# Patient Record
(demographics unavailable — no encounter records)

---

## 2021-07-30 NOTE — NUR
Consult rec'd for no insurance and medication assistance. Writer visited with
the pt and her spouse at bedside. She works part time and does not have ins.
She does have a cardiologist but can not afford to see if often and was not
able to get medication. She has been screened [facility] and they started
a mo medicaid application. She will be eligable for medicare next year at age
65. [facility] clinics and goodx coupon given and discussed. [facility] walkin clinic encouraged. Four scripts given to the [facility] pharmacy
and vouchered for the pt. They will be locked in the med room to be issued at
MD. Pt will try to get any others at [facility]. Nursing updated. Possible dc 1-2
days.

## 2021-07-30 NOTE — NUR
REPORT GIVEN TO CCU RN JAN. CURRENT NTG DRIP SETTING IS 10MCG/MIN(6ML/HR).
INCREASED HEADACHE WITH INCREASED NTG DOSE - RECEIVED TYLENOL AND DISCOMFORT
IS TOLERABLE FOR PATIENT AT THIS TIME.

## 2021-07-30 NOTE — NUR
ASSUMED CARE SHIFT CHANGE. VSS BP ELEVATED ORDERS RECEIVED. BP STABLE NITRO
GTT DC THIS AM. PT UP WALKING CIRILO WELL. DENIES CP/SOB. FAMILY AT BEDSIDE.
PRESCRIPTIONS STORED IN IV BIN SECTION IN [email or web address] FOR WHEN PT DC'S. SR TELE.
CONT POC. WILL REPORT TO NOC RN.

## 2021-07-30 NOTE — 2DMMODE
[facility]
[place]   [address]                   2 D/M-MODE ECHOCARDIOGRAM     
_______________________________________________________________________________
 
Name:       GERALDO ROWELL               Room #:         217-P       ADM IN  
..#:      [id number]                       Account #:      [id number]  
Admission:  07/29/21    Attend Phys:    Candido Taylor MD     
Discharge:              Date of Birth:  03/26/56  
                                                          Report #: [id number]
                                                                    [id number]
_______________________________________________________________________________
THIS REPORT FOR:  
 
cc:  CARMEN - Avelina family physician/PCP 
     CARMEN - Avelina family physician/PCP 
     Abel Fernandes MD [facility]                                         
                                                                       ~
 
--------------- APPROVED REPORT --------------
 
 
Study performed:  07/30/2021 06:50:21
 
EXAM: Comprehensive 2D, Doppler, and color-flow 
Echocardiogram 
Patient Location: Bedside   
Room #:  217     Status:  routine
 
      BSA:         1.87
HR: 79 bpm BP:          152/107 mmHg 
Rhythm: NSR     
 
Other Information 
Study Quality: Good
 
Indications
Leg edema, [place], elevated troponin, CHF.
 
2D Dimensions
RVDd:  37.00 mm   
IVSd:  13.00 (7-11mm)  LVOT Diam:  20.33 (18-24mm) 
LVDd:  48.00 mm   
PWd:  13.00 (7-11mm)  Ascending Ao:  38.00 (22-36mm)
LVDs:  35.00 (25-40mm)  
Left Atrium:  43.00 (27-40mm) 
Aortic Root:  31.25 mm  
 
Volumes
Left Atrial Volume (Systole) 
Single Plane 4CH:  83.26 mL Single Plane 2CH:  86.27 mL
    LA ESV Index:  49.00 mL/m2
 
Aortic Valve
AoV Peak Chato.:  2.16 m/s 
AO Peak Gr.:  18.64 mmHg LVOT Max PG:  15.99 mmHg
AO Mean Gr.:  10.91 mmHg 
AO V2 Mean:  1.60 m/s  LVOT Max V:  2.00 m/s
 
 
[facility]
[address]
[place]  [address]
Phone:  [phone]                    2 D/M-MODE ECHOCARDIOGRAM     
_______________________________________________________________________________
 
Name:            GERALDO ROWELL               Room #:        217-P       [facility] [place]
..#:           [id number]          Account #:     [id number]  
Admission:       07/29/21         Attend Phys:   Candido Taylor MD 
Discharge:                  Date of Birth: 03/26/56  
                         Report #:      [id number]
        [id number]
_______________________________________________________________________________
AO V2 VTI:  39.21 cm  
ONEIL Vmax: 3.00 cm2  
AI Vmax:  5.08 m/s  
AI Carlisle:  3.87 m/s2  
AI PHT:  380.78 ms  
 
Mitral Valve
   MV Decel. Time:  266.19 ms
MV PHT:  77.20 ms 
 
Pulmonary Valve
PV Peak Chato.:  0.70 m/s PV Peak Gr.:  1.94 mmHg
 
Tricuspid Valve
TR Peak Chato.:  3.72 m/s  RAP Estimate:  10.00 mmHg
TR Peak Gr.:  55.46 mmHg 
    PA Pressure:  65.00 mmHg
 
Left Ventricle
The left ventricle is normal size. There is normal LV segmental wall 
motion. Mild concentric left ventricular hypertrophy. Moderate basal 
septal hypertrophy is present. Left ventricular systolic function is 
normal. LVEF is 60-65%.
 
Right Ventricle
The right ventricle is normal size. The right ventricular systolic 
function is normal.
 
Atria
Left atrium is severely dilated. Right atrium is moderately 
dilated.
 
Aortic Valve
Aortic valve is trileaflet; mildly sclerotic.  Mild to moderate 
aortic regurgitation. There is no aortic valvular stenosis.
 
Mitral Valve
Mitral valve leaflets are moderately thickened with mild restricted 
mobility. Severe mitral regurgitation. Mild mitral stenosis (Peak 
pressure gradient of 18mmHg; mean 7mmHg).
 
Tricuspid Valve
The tricuspid valve is normal in structure. Moderate tricuspid 
regurgitation. Estimated PAP is 65mmHg.
 
Pulmonic Valve
 
 
[facility]
[address]
[place]  [address]
Phone:  [phone]                    2 D/M-MODE ECHOCARDIOGRAM     
_______________________________________________________________________________
 
Name:            GERALDO ROWELL               Room #:        217-P       [facility] [place]
.R.#:           [id number]          Account #:     [id number]  
Admission:       07/29/21         Attend Phys:   Candido Taylor MD 
Discharge:                  Date of Birth: 03/26/56  
                         Report #:      [id number]
        [id number]
_______________________________________________________________________________
The pulmonary valve is normal in structure. There is no pulmonic 
valvular regurgitation.
 
Great Vessels
The aortic root is normal in size. The ascending aorta is normal in 
size. IVC is dilated and collapses <50% with 
inspiration.
 
Pericardium
There is no pericardial effusion.
 
<Conclusion>
Normal left ventricular size with mild concentric hypertrophy and 
moderate basal septal hypertrophy
Ejection fraction 60%
Normal right ventricular size/function
Left atrium moderate to severely dilated, right atrium moderately 
dilated
Color-flow Doppler studies performed of the 
aortic/mitral/tricuspid/pulmonary valve
Mild to moderate aortic valve insufficiency
Severe mitral valve insufficiency
Myxomatous mitral valve
Mild mitral valve stenosis, mean gradient of 7 mmHg
Moderate tricuspid valve insufficiency
Pulmonary systolic pressure estimated 65 mmHg
No pericardial effusion
Normal aortic root size.
 
 
 
 
 
 
 
 
 
 
 
 
 
 
 
 
  <ELECTRONICALLY SIGNED>
   By: Abel Fernandes MD, FACC    
  07/30/21     0753
D: 07/30/21 0753                           _____________________________________
T: 07/30/21 0753                           Abel Fernandes MD, FACC      /INF

## 2021-07-30 NOTE — EKG
[address]  [address]
Phone:  [phone]                    ELECTROCARDIOGRAM REPORT      
_______________________________________________________________________________
 
Name:       GERALDO ROWELL               Room #:         217-P       [facility] [place]  
..#:      [id number]     Account #:      [id number]  
Admission:  21    Attend Phys:    Candido Taylor MD     
Discharge:              Date of Birth:  56  
                                                          Report #: [id number]
   [id number]
_______________________________________________________________________________
                         [facility] ED
                                       
Test Date:    2021               Test Time:    17:02:44
Pat Name:     GERALDO ROWELL            Department:   
Patient ID:   [id number]            Room:         217
Gender:       F                        Technician:   IVAN
:          [id number]               Requested By: Crow Gaitan
Order Number: [id number] MD:   Abel Fernandes
                                 Measurements
Intervals                              Axis          
Rate:         93                       P:            75
DC:           136                      QRS:          -32
QRSD:         114                      T:            121
QT:           401                                    
QTc:          499                                    
                           Interpretive Statements
Sinus rhythm
Probable left atrial enlargement
LVH with IVCD and secondary repol abnrm
J Point  elevation, probably due to LVH
Borderline prolonged QT interval
No previous ECG available for comparison
Electronically Signed On 2021 7:17:46 CDT by Abel Fernandes
[email or web address]
 
 
 
 
 
 
 
 
 
 
 
 
 
 
 
 
 
 
 
  <ELECTRONICALLY SIGNED>
   By: Abel Fernandes MD, [facility]    
  21     0717
D: 21 170                           _____________________________________
T: 21                           Abel Fernandes MD, [facility]      /EPI

## 2021-07-30 NOTE — NUR
RECIEVED FROM ER PER WHEELCHAIR. UP AD GISELA WITH STEADY GAIT. NTG GTT REMAINS
ON PER ORDERS FOR ELEVATED BP. ORIENTED TO ROOM AND FLOOR POLICIES. DENIES
COMPLAINTS OF PAIN. WORKING ON GOALS AND PLAN OF CARE,

## 2021-07-31 NOTE — NUR
assessment as charted , meds as per mar .  christy diet and fluids.  up ad jordyn in
room.  no co's of pain or nausea.   pt states that leg swelling decreases with
the use of scds.  no co's at the present time.

## 2021-07-31 NOTE — NUR
UP AD GISELA IN ROOM. SHOWERED LAST NOC AND STATES HELPED LEG PAIN. LE SWELLING
DECREASING. MONITOR BLOOD PRESSURE AND PRN HYDRALIZINE GIVEN X2 THIS SHIFT.
WORKING ON GOALS AND PLAN OF CARE FOR NOC. CONTINUE TO ASSES.

## 2021-08-01 NOTE — NUR
SLEPT MOST OF SHIFT PAST SHOWER. STATES SHOWER HELPED WITH LEG ACHES. WORKING
ON GOALS AND PLAN OF CARE FOR NOC. UP AD GISELA WITH STEADY GAIT. WORKING ON
GOALS AND PLAN OF CARE FOR NOC. CONTINUE TO ASSES CLOSELY.

## 2021-08-01 NOTE — NUR
ASSESSMENT AS CHARTED-  MEDS AS PER MAR -  CIRILO DIET AND FLUIDS.  UP AD GISELA IN
HER ROOM - NO CO'S OF PAIN OR NAUSEA. NO CO'S OPF PAIN OR NASUEA.  SWELLING TO
FEET BILAT 1+. IV LASIX D/C'D AND CHAGED TO PO -  NO CO'S AT THE PRESENT TIME.

## 2021-08-02 NOTE — NUR
PT PROGRESSING TOWARDS POC GOALS. VSS AND BP CONTROLLED TODAY. SOME BOUTS OF
NAUSEA, PRN MEDS AND 7-UP GIVEN WITH SOME RELIEF. DC INFO GIVEN, WORKING TO
GET ALL RX FILLED AND COVERED WITH [facility] WITH [facility]. PT UPDATED
ON POC AND DC TODAY HOME. IV AND TELE DC'D PRIOR TO DC.
WILL CONT TO MONITOR WHILE ON UNIT WAITING FOR HER RIDE.